# Patient Record
Sex: FEMALE | Race: WHITE | NOT HISPANIC OR LATINO | URBAN - METROPOLITAN AREA
[De-identification: names, ages, dates, MRNs, and addresses within clinical notes are randomized per-mention and may not be internally consistent; named-entity substitution may affect disease eponyms.]

---

## 2020-01-17 ENCOUNTER — OFFICE (OUTPATIENT)
Dept: URBAN - METROPOLITAN AREA CLINIC 10 | Facility: CLINIC | Age: 80
End: 2020-01-17

## 2020-01-17 VITALS
HEIGHT: 65 IN | HEART RATE: 80 BPM | SYSTOLIC BLOOD PRESSURE: 125 MMHG | DIASTOLIC BLOOD PRESSURE: 48 MMHG | WEIGHT: 152 LBS

## 2020-01-17 DIAGNOSIS — D64.9 ANEMIA, UNSPECIFIED: ICD-10-CM

## 2020-01-17 DIAGNOSIS — K26.4 CHRONIC OR UNSPECIFIED DUODENAL ULCER WITH HEMORRHAGE: ICD-10-CM

## 2020-01-17 DIAGNOSIS — Z86.010 PERSONAL HISTORY OF COLONIC POLYPS: ICD-10-CM

## 2020-01-17 PROCEDURE — 99203 OFFICE O/P NEW LOW 30 MIN: CPT | Performed by: INTERNAL MEDICINE

## 2020-01-17 RX ORDER — POLYETHYLENE GLYCOL 3350, SODIUM SULFATE, SODIUM CHLORIDE, POTASSIUM CHLORIDE, ASCORBIC ACID, SODIUM ASCORBATE 140-9-5.2G
KIT ORAL
Qty: 1 | Refills: 0 | Status: COMPLETED
Start: 2020-01-17 | End: 2022-02-10

## 2020-01-17 NOTE — SERVICENOTES
The patient's assessment was discussed face to face with Dr. Carreon and a collaborative plan of care was established.

## 2020-01-17 NOTE — SERVICEHPINOTES
Terrie Roman   is a  79   year old  female  who is seen here today for a first visit. She is seen in consultation for  Fabio Weiss  .    She is here today after being in hospital in November 2019 when she was anemic and found to have duodenal ulcer and negative bx. She denies taking any NSAIDs. She has been taking pantoprazole and has no current complaints denying any abdominal pain, hematemesis, dark stools, hematochezia, or any other GI complaints. She is currently finishing chemotherapy for breast cancer. She otherwise is doing well today. Records reviewed from PCP and hospital. Her H/H in December with PCP was 8.8/26.3, but she says "that has gone back to normal on last lab with PCP weeks ago." Was taking PO iron, subsequently taken off when anemia resolved.

## 2022-02-10 ENCOUNTER — OFFICE (OUTPATIENT)
Dept: URBAN - METROPOLITAN AREA CLINIC 10 | Facility: CLINIC | Age: 82
End: 2022-02-10

## 2022-02-10 VITALS
HEIGHT: 65 IN | OXYGEN SATURATION: 91 % | WEIGHT: 158 LBS | SYSTOLIC BLOOD PRESSURE: 137 MMHG | DIASTOLIC BLOOD PRESSURE: 62 MMHG | HEART RATE: 59 BPM

## 2022-02-10 DIAGNOSIS — Z87.11 PERSONAL HISTORY OF PEPTIC ULCER DISEASE: ICD-10-CM

## 2022-02-10 DIAGNOSIS — Z86.010 PERSONAL HISTORY OF COLONIC POLYPS: ICD-10-CM

## 2022-02-10 PROCEDURE — 99203 OFFICE O/P NEW LOW 30 MIN: CPT | Performed by: INTERNAL MEDICINE
